# Patient Record
Sex: MALE | Race: WHITE | Employment: UNEMPLOYED
[De-identification: names, ages, dates, MRNs, and addresses within clinical notes are randomized per-mention and may not be internally consistent; named-entity substitution may affect disease eponyms.]

---

## 2022-12-27 ENCOUNTER — NURSE ONLY (OUTPATIENT)
Dept: FAMILY MEDICINE CLINIC | Facility: CLINIC | Age: 27
End: 2022-12-27
Payer: COMMERCIAL

## 2022-12-27 ENCOUNTER — OFFICE VISIT (OUTPATIENT)
Dept: FAMILY MEDICINE CLINIC | Facility: CLINIC | Age: 27
End: 2022-12-27
Payer: COMMERCIAL

## 2022-12-27 VITALS
HEIGHT: 67 IN | DIASTOLIC BLOOD PRESSURE: 82 MMHG | SYSTOLIC BLOOD PRESSURE: 123 MMHG | TEMPERATURE: 98.2 F | RESPIRATION RATE: 16 BRPM | HEART RATE: 85 BPM | OXYGEN SATURATION: 98 % | WEIGHT: 212.6 LBS | BODY MASS INDEX: 33.37 KG/M2

## 2022-12-27 DIAGNOSIS — Z00.00 ENCOUNTER FOR GENERAL ADULT MEDICAL EXAMINATION WITHOUT ABNORMAL FINDINGS: Primary | ICD-10-CM

## 2022-12-27 DIAGNOSIS — Z00.00 ENCOUNTER FOR GENERAL ADULT MEDICAL EXAMINATION WITHOUT ABNORMAL FINDINGS: ICD-10-CM

## 2022-12-27 LAB
ALBUMIN SERPL-MCNC: 4.1 G/DL (ref 3.5–5)
ALBUMIN/GLOB SERPL: 1.5 {RATIO} (ref 0.4–1.6)
ALP SERPL-CCNC: 62 U/L (ref 50–136)
ALT SERPL-CCNC: 23 U/L (ref 12–65)
ANION GAP SERPL CALC-SCNC: 3 MMOL/L (ref 2–11)
AST SERPL-CCNC: 11 U/L (ref 15–37)
BASOPHILS # BLD: 0.1 K/UL (ref 0–0.2)
BASOPHILS NFR BLD: 1 % (ref 0–2)
BILIRUB SERPL-MCNC: 0.3 MG/DL (ref 0.2–1.1)
BILIRUBIN, URINE, POC: NEGATIVE
BLOOD URINE, POC: NEGATIVE
BUN SERPL-MCNC: 14 MG/DL (ref 6–23)
CALCIUM SERPL-MCNC: 9.2 MG/DL (ref 8.3–10.4)
CHLORIDE SERPL-SCNC: 107 MMOL/L (ref 101–110)
CHOLEST SERPL-MCNC: 157 MG/DL
CO2 SERPL-SCNC: 31 MMOL/L (ref 21–32)
CREAT SERPL-MCNC: 1.1 MG/DL (ref 0.8–1.5)
DIFFERENTIAL METHOD BLD: NORMAL
EOSINOPHIL # BLD: 0.1 K/UL (ref 0–0.8)
EOSINOPHIL NFR BLD: 1 % (ref 0.5–7.8)
ERYTHROCYTE [DISTWIDTH] IN BLOOD BY AUTOMATED COUNT: 12.2 % (ref 11.9–14.6)
GLOBULIN SER CALC-MCNC: 2.8 G/DL (ref 2.8–4.5)
GLUCOSE SERPL-MCNC: 91 MG/DL (ref 65–100)
GLUCOSE URINE, POC: NEGATIVE
HCT VFR BLD AUTO: 48.3 % (ref 41.1–50.3)
HDLC SERPL-MCNC: 39 MG/DL (ref 40–60)
HDLC SERPL: 4 {RATIO}
HGB BLD-MCNC: 15.7 G/DL (ref 13.6–17.2)
IMM GRANULOCYTES # BLD AUTO: 0 K/UL (ref 0–0.5)
IMM GRANULOCYTES NFR BLD AUTO: 0 % (ref 0–5)
KETONES, URINE, POC: NEGATIVE
LDLC SERPL CALC-MCNC: 85.6 MG/DL
LEUKOCYTE ESTERASE, URINE, POC: NEGATIVE
LYMPHOCYTES # BLD: 2.1 K/UL (ref 0.5–4.6)
LYMPHOCYTES NFR BLD: 39 % (ref 13–44)
MCH RBC QN AUTO: 29.8 PG (ref 26.1–32.9)
MCHC RBC AUTO-ENTMCNC: 32.5 G/DL (ref 31.4–35)
MCV RBC AUTO: 91.8 FL (ref 82–102)
MONOCYTES # BLD: 0.5 K/UL (ref 0.1–1.3)
MONOCYTES NFR BLD: 8 % (ref 4–12)
NEUTS SEG # BLD: 2.8 K/UL (ref 1.7–8.2)
NEUTS SEG NFR BLD: 51 % (ref 43–78)
NITRITE, URINE, POC: NEGATIVE
NRBC # BLD: 0 K/UL (ref 0–0.2)
PH, URINE, POC: 6 (ref 4.6–8)
PLATELET # BLD AUTO: 238 K/UL (ref 150–450)
PMV BLD AUTO: 11.3 FL (ref 9.4–12.3)
POTASSIUM SERPL-SCNC: 4.5 MMOL/L (ref 3.5–5.1)
PROT SERPL-MCNC: 6.9 G/DL (ref 6.3–8.2)
PROTEIN,URINE, POC: NEGATIVE
RBC # BLD AUTO: 5.26 M/UL (ref 4.23–5.6)
SODIUM SERPL-SCNC: 141 MMOL/L (ref 133–143)
SPECIFIC GRAVITY, URINE, POC: 1.01 (ref 1–1.03)
TRIGL SERPL-MCNC: 162 MG/DL (ref 35–150)
URINALYSIS CLARITY, POC: CLEAR
URINALYSIS COLOR, POC: YELLOW
UROBILINOGEN, POC: NORMAL
VLDLC SERPL CALC-MCNC: 32.4 MG/DL (ref 6–23)
WBC # BLD AUTO: 5.4 K/UL (ref 4.3–11.1)

## 2022-12-27 PROCEDURE — 81002 URINALYSIS NONAUTO W/O SCOPE: CPT | Performed by: FAMILY MEDICINE

## 2022-12-27 PROCEDURE — 99395 PREV VISIT EST AGE 18-39: CPT | Performed by: FAMILY MEDICINE

## 2022-12-27 ASSESSMENT — ENCOUNTER SYMPTOMS
SINUS PAIN: 0
COUGH: 0
SHORTNESS OF BREATH: 0
VOMITING: 0
NAUSEA: 0
CONSTIPATION: 0
BACK PAIN: 0
DIARRHEA: 0
WHEEZING: 0

## 2022-12-27 ASSESSMENT — PATIENT HEALTH QUESTIONNAIRE - PHQ9
SUM OF ALL RESPONSES TO PHQ QUESTIONS 1-9: 0
SUM OF ALL RESPONSES TO PHQ9 QUESTIONS 1 & 2: 0
SUM OF ALL RESPONSES TO PHQ QUESTIONS 1-9: 0
2. FEELING DOWN, DEPRESSED OR HOPELESS: 0
SUM OF ALL RESPONSES TO PHQ QUESTIONS 1-9: 0
1. LITTLE INTEREST OR PLEASURE IN DOING THINGS: 0
SUM OF ALL RESPONSES TO PHQ QUESTIONS 1-9: 0

## 2022-12-27 NOTE — PROGRESS NOTES
Subjective:      Patient ID: Fabiola Lozoya is a 32 y.o. male. HPI  The patient comes in today for a complete physical.  Doing well otherwise no new complaints. Patient has lost 16 pounds in the last year, he has been walking more and watching his diet as well as cutting back on portion control. History reviewed. No pertinent past medical history. No Known Allergies    No current outpatient medications on file. No current facility-administered medications for this visit. Past Surgical History:   Procedure Laterality Date    CYST REMOVAL      HEENT      Tonsillectomy       Social History     Tobacco Use    Smoking status: Never    Smokeless tobacco: Never   Substance Use Topics    Alcohol use: Never    Drug use: Never       Family History   Problem Relation Age of Onset    Heart Disease Maternal Grandfather     Thyroid Disease Mother     Cancer Father         Hx of Leukemia    Hypertension Mother       Review of Systems   Constitutional:  Negative for fatigue. HENT:  Negative for congestion and sinus pain. Respiratory:  Negative for cough, shortness of breath and wheezing. Cardiovascular:  Negative for chest pain. Gastrointestinal:  Negative for constipation, diarrhea, nausea and vomiting. Genitourinary:  Negative for difficulty urinating, dysuria and frequency. Musculoskeletal:  Negative for back pain and joint swelling. Neurological:  Negative for dizziness and headaches. Psychiatric/Behavioral:  Negative for sleep disturbance. Blood pressure 123/82, pulse 85, temperature 98.2 °F (36.8 °C), temperature source Temporal, resp. rate 16, height 5' 7.32\" (1.71 m), weight 212 lb 9.6 oz (96.4 kg), SpO2 98 %. Objective:   Physical Exam  Vitals reviewed. Constitutional:       General: He is not in acute distress. Appearance: Normal appearance.    HENT:      Right Ear: Tympanic membrane and ear canal normal.      Left Ear: Tympanic membrane and ear canal normal. Mouth/Throat:      Mouth: Mucous membranes are moist.      Pharynx: No posterior oropharyngeal erythema. Cardiovascular:      Rate and Rhythm: Normal rate and regular rhythm. Heart sounds: No murmur heard. Pulmonary:      Effort: Pulmonary effort is normal.      Breath sounds: Normal breath sounds. Abdominal:      General: Abdomen is flat. Bowel sounds are normal. There is no distension. Palpations: There is no mass. Tenderness: There is no abdominal tenderness. There is no guarding. Musculoskeletal:         General: No swelling. Right lower leg: No edema. Left lower leg: No edema. Skin:     General: Skin is warm. Findings: No rash. Neurological:      General: No focal deficit present. Mental Status: He is alert and oriented to person, place, and time. Psychiatric:         Mood and Affect: Mood normal.     Results for orders placed or performed in visit on 12/27/22   AMB POC URINALYSIS DIP STICK MANUAL W/O MICRO   Result Value Ref Range    Color (UA POC) Yellow     Clarity (UA POC) Clear     Glucose, Urine, POC Negative Negative    Bilirubin, Urine, POC Negative Negative    Ketones, Urine, POC Negative Negative    Specific Gravity, Urine, POC 1.010 1.001 - 1.035    Blood (UA POC) Negative Negative    pH, Urine, POC 6.0 4.6 - 8.0    Protein, Urine, POC Negative Negative    Urobilinogen, POC Normal     Nitrite, Urine, POC Negative Negative    Leukocyte Esterase, Urine, POC Negative Negative       Assessment / Plan:    Amy Hawkins was seen today for annual exam.    Diagnoses and all orders for this visit:    Encounter for general adult medical examination without abnormal findings  -     AMB POC URINALYSIS DIP STICK MANUAL W/O MICRO  -     CBC with Auto Differential; Future  -     Comprehensive Metabolic Panel; Future  -     Lipid Panel; Future       Will notify patient of test results. Healthy lifestyle information handout was given.   Continue with exercise and diet.  Follow-up and Dispositions    Return in about 1 year (around 12/27/2023), or if symptoms worsen or fail to improve. Dictated using voice recognition software.  Proofread, but unrecognized errors may exist.

## 2022-12-28 NOTE — RESULT ENCOUNTER NOTE
Please tell patient that his total cholesterol is 156 with a goal less than 200 and it has improved from 186 a year ago. Triglycerides also have improved from 195 down to 162 so continue with diet and exercise. Blood sugar is normal at 91, kidney function and liver enzymes are good. Hemoglobin is good at 15. 7

## 2023-12-28 ENCOUNTER — OFFICE VISIT (OUTPATIENT)
Dept: FAMILY MEDICINE CLINIC | Facility: CLINIC | Age: 28
End: 2023-12-28
Payer: COMMERCIAL

## 2023-12-28 VITALS
OXYGEN SATURATION: 97 % | SYSTOLIC BLOOD PRESSURE: 120 MMHG | HEIGHT: 67 IN | WEIGHT: 214 LBS | TEMPERATURE: 98 F | DIASTOLIC BLOOD PRESSURE: 75 MMHG | RESPIRATION RATE: 16 BRPM | BODY MASS INDEX: 33.59 KG/M2 | HEART RATE: 96 BPM

## 2023-12-28 DIAGNOSIS — Z00.00 ENCOUNTER FOR GENERAL ADULT MEDICAL EXAMINATION WITHOUT ABNORMAL FINDINGS: Primary | ICD-10-CM

## 2023-12-28 LAB
ALBUMIN SERPL-MCNC: 4.4 G/DL (ref 3.5–5)
ALBUMIN/GLOB SERPL: 1.3 (ref 0.4–1.6)
ALP SERPL-CCNC: 72 U/L (ref 50–136)
ALT SERPL-CCNC: 33 U/L (ref 12–65)
ANION GAP SERPL CALC-SCNC: 7 MMOL/L (ref 2–11)
AST SERPL-CCNC: 14 U/L (ref 15–37)
BASOPHILS # BLD: 0.1 K/UL (ref 0–0.2)
BASOPHILS NFR BLD: 1 % (ref 0–2)
BILIRUB SERPL-MCNC: 0.5 MG/DL (ref 0.2–1.1)
BILIRUBIN, URINE, POC: NEGATIVE
BLOOD URINE, POC: NORMAL
BUN SERPL-MCNC: 10 MG/DL (ref 6–23)
CALCIUM SERPL-MCNC: 9.6 MG/DL (ref 8.3–10.4)
CHLORIDE SERPL-SCNC: 105 MMOL/L (ref 103–113)
CHOLEST SERPL-MCNC: 190 MG/DL
CO2 SERPL-SCNC: 28 MMOL/L (ref 21–32)
CREAT SERPL-MCNC: 1.2 MG/DL (ref 0.8–1.5)
DIFFERENTIAL METHOD BLD: NORMAL
EOSINOPHIL # BLD: 0 K/UL (ref 0–0.8)
EOSINOPHIL NFR BLD: 1 % (ref 0.5–7.8)
ERYTHROCYTE [DISTWIDTH] IN BLOOD BY AUTOMATED COUNT: 12.1 % (ref 11.9–14.6)
GLOBULIN SER CALC-MCNC: 3.4 G/DL (ref 2.8–4.5)
GLUCOSE SERPL-MCNC: 83 MG/DL (ref 65–100)
GLUCOSE URINE, POC: NEGATIVE
HCT VFR BLD AUTO: 50 % (ref 41.1–50.3)
HDLC SERPL-MCNC: 35 MG/DL (ref 40–60)
HDLC SERPL: 5.4
HGB BLD-MCNC: 16.5 G/DL (ref 13.6–17.2)
IMM GRANULOCYTES # BLD AUTO: 0 K/UL (ref 0–0.5)
IMM GRANULOCYTES NFR BLD AUTO: 0 % (ref 0–5)
KETONES, URINE, POC: NEGATIVE
LDLC SERPL CALC-MCNC: 106.2 MG/DL
LEUKOCYTE ESTERASE, URINE, POC: NEGATIVE
LYMPHOCYTES # BLD: 3 K/UL (ref 0.5–4.6)
LYMPHOCYTES NFR BLD: 42 % (ref 13–44)
MCH RBC QN AUTO: 29.5 PG (ref 26.1–32.9)
MCHC RBC AUTO-ENTMCNC: 33 G/DL (ref 31.4–35)
MCV RBC AUTO: 89.4 FL (ref 82–102)
MONOCYTES # BLD: 0.5 K/UL (ref 0.1–1.3)
MONOCYTES NFR BLD: 7 % (ref 4–12)
NEUTS SEG # BLD: 3.6 K/UL (ref 1.7–8.2)
NEUTS SEG NFR BLD: 49 % (ref 43–78)
NITRITE, URINE, POC: NEGATIVE
NRBC # BLD: 0 K/UL (ref 0–0.2)
PH, URINE, POC: 6.5 (ref 4.6–8)
PLATELET # BLD AUTO: 298 K/UL (ref 150–450)
PMV BLD AUTO: 10.9 FL (ref 9.4–12.3)
POTASSIUM SERPL-SCNC: 3.9 MMOL/L (ref 3.5–5.1)
PROT SERPL-MCNC: 7.8 G/DL (ref 6.3–8.2)
PROTEIN,URINE, POC: NORMAL
RBC # BLD AUTO: 5.59 M/UL (ref 4.23–5.6)
SODIUM SERPL-SCNC: 140 MMOL/L (ref 136–146)
SPECIFIC GRAVITY, URINE, POC: 1 (ref 1–1.03)
TRIGL SERPL-MCNC: 244 MG/DL (ref 35–150)
TSH, 3RD GENERATION: 1.88 UIU/ML (ref 0.36–3.74)
URINALYSIS CLARITY, POC: CLEAR
URINALYSIS COLOR, POC: YELLOW
UROBILINOGEN, POC: NORMAL
VLDLC SERPL CALC-MCNC: 48.8 MG/DL (ref 6–23)
WBC # BLD AUTO: 7.2 K/UL (ref 4.3–11.1)

## 2023-12-28 PROCEDURE — 81002 URINALYSIS NONAUTO W/O SCOPE: CPT | Performed by: FAMILY MEDICINE

## 2023-12-28 PROCEDURE — 99395 PREV VISIT EST AGE 18-39: CPT | Performed by: FAMILY MEDICINE

## 2023-12-28 NOTE — PROGRESS NOTES
worsen or fail to improve. Dictated using voice recognition software.  Proofread, but unrecognized errors may exist.